# Patient Record
Sex: MALE | Race: WHITE
[De-identification: names, ages, dates, MRNs, and addresses within clinical notes are randomized per-mention and may not be internally consistent; named-entity substitution may affect disease eponyms.]

---

## 2023-09-15 ENCOUNTER — HOSPITAL ENCOUNTER (EMERGENCY)
Dept: HOSPITAL 33 - ED | Age: 78
Discharge: HOME | End: 2023-09-15
Payer: MEDICARE

## 2023-09-15 VITALS — OXYGEN SATURATION: 95 % | TEMPERATURE: 98 F

## 2023-09-15 VITALS — RESPIRATION RATE: 18 BRPM | HEART RATE: 76 BPM | DIASTOLIC BLOOD PRESSURE: 101 MMHG | SYSTOLIC BLOOD PRESSURE: 155 MMHG

## 2023-09-15 DIAGNOSIS — Z79.84: ICD-10-CM

## 2023-09-15 DIAGNOSIS — Z79.899: ICD-10-CM

## 2023-09-15 DIAGNOSIS — E11.9: ICD-10-CM

## 2023-09-15 DIAGNOSIS — E78.5: ICD-10-CM

## 2023-09-15 DIAGNOSIS — R04.0: Primary | ICD-10-CM

## 2023-09-15 DIAGNOSIS — I10: ICD-10-CM

## 2023-09-15 DIAGNOSIS — Z72.0: ICD-10-CM

## 2023-09-15 PROCEDURE — 99281 EMR DPT VST MAYX REQ PHY/QHP: CPT

## 2023-09-15 NOTE — ERPHSYRPT
- History of Present Illness


Time Seen by Provider: 09/15/23 09:11


Source: patient


Exam Limitations: no limitations


Patient Subjective Stated Complaint: Pt hadn't felt well for a few days and 

thought he was having some sinus pressure and when he woke this morning he began

having a massive nose bleed


Triage Nursing Assessment: Pt was brought to the ER by his friend, hypertensive,

denies pain, pt had a headache above his eyes last night and took a full 

strength aspirin and woke with a bloody nose at 0400, pt did take an aspirin the

night before, as he states that it helps him sleep, pt states that it was a 

large amount of blood but the bleeding appears to be under control at this time,

pt states that he has never had a nose bleed before, pulses normal, skin n/w/d, 

doesn't appear to be in any distress


Physician History: 


Patient is a 77-year-old male presents to our ED for evaluation and treatment of

epistaxis.  Patient states he awoke this morning at approximately 4 AM with a 

nosebleed.  Patient packed his nose just prior to arrival.  No active bleeding 

at this time.  Patient states yesterday he was experiencing pain in his frontal 

sinus.  Patient took a "full-strength aspirin".  Patient currently feels well.  

He denies any frontal headache or pain at this time.  Patient denies a history 

of nosebleed.  He is not on a blood thinner.  Patient otherwise feels well.  He 

voices no other complaints or concerns at this time.





Portions of this note were created with voice recognition technology.  There may

be grammatical, spelling, punctuation or sound alike errors





Timing/Duration: today


Severity: moderate


Modifying Factors: Improves With: other (Nose packing stopped the bleed.  He 

denies experiencing bleeding posteriorly into throat)


Associated Symptoms: denies symptoms


Allergies/Adverse Reactions: 








No Known Drug Allergies Allergy (Verified 09/15/23 08:37)


   





Home Medications: 








Dapagliflozin Propanediol [Farxiga] 10 mg PO DAILY 09/15/23 [History]


Losartan/Hydrochlorothiazide [Losartan-Hctz 100-25 mg Tab] 0.5 tab PO DAILY 

09/15/23 [History]


Rosuvastatin Calcium 20 mg PO DAILY 09/15/23 [History]


carvediloL [Carvedilol] 25 mg PO BID 09/15/23 [History]





Hx Influenza Vaccination/Date Given: No


Hx Pneumococcal Vaccination/Date Given: No





Travel Risk





- International Travel


Have you traveled outside of the country in past 3 weeks: No





- Coronavirus Screening


Are you exhibiting any of the following symptoms?: No


Close contact with a COVID-19 positive Pt in past 14-21 Days: No





- Vaccine Status


Have you recieved a Covid-19 vaccination: Yes


: Pfizer





- Vaccination Dates


Date of 2cond Vaccination (if applicable): 2021





- Review of Systems


Constitutional: No Symptoms, No Fever, No Chills


Eyes: No Symptoms


Ears, Nose, & Throat: No Symptoms


Respiratory: No Symptoms, No Cough, No Dyspnea


Cardiac: No Symptoms, No Chest Pain, No Edema, No Syncope


Abdominal/Gastrointestinal: No Symptoms, No Abdominal Pain, No Nausea, No 

Vomiting, No Diarrhea


Genitourinary Symptoms: No Symptoms, No Dysuria


Musculoskeletal: No Symptoms, No Back Pain, No Neck Pain


Skin: No Symptoms, No Rash


Neurological: No Symptoms, No Dizziness, No Focal Weakness, No Sensory Changes


Psychological: No Symptoms


Endocrine: No Symptoms


Hematologic/Lymphatic: No Symptoms


Immunological/Allergic: No Symptoms


All Other Systems: Reviewed and Negative





- Past Medical History


Pertinent Past Medical History: Yes


Cardiac History: High Cholesterol, Hypertension


Endocrine Medical History: Diabetes Type II


 History: Renal Disease





- Past Surgical History


Past Surgical History: No





- Social History


Smoking Status: Current some day smoker


Exposure to second hand smoke: Yes


Drug Use: none


Patient Lives Alone: Yes





- Nursing Vital Signs


Nursing Vital Signs: 


                               Initial Vital Signs











Temperature  98.0 F   09/15/23 08:23


 


Pulse Rate  90   09/15/23 08:23


 


Blood Pressure  167/78   09/15/23 08:23


 


O2 Sat by Pulse Oximetry  95   09/15/23 08:23








                                   Pain Scale











Pain Intensity                 0

















- Physical Exam


General Appearance: no apparent distress, alert


Eye Exam: PERRL/EOMI, eyes nml inspection


Ears, Nose, Throat Exam: normal ENT inspection, TMs normal, pharynx normal, 

moist mucous membranes, other (Packing in left nare which patient placed at home

 from a first-aid kit)


Neck Exam: normal inspection, non-tender, supple, full range of motion


Respiratory Exam: normal breath sounds, lungs clear, airway intact, No 

respiratory distress


Cardiovascular Exam: regular rate/rhythm, normal heart sounds, normal peripheral

 pulses


Gastrointestinal/Abdomen Exam: soft, normal bowel sounds, No tenderness, No mass


Back Exam: normal inspection, normal range of motion, No CVA tenderness, No 

vertebral tenderness


Extremity Exam: normal inspection, normal range of motion, pelvis stable


Neurologic Exam: alert, oriented x 3, cooperative, normal mood/affect, nml 

cerebellar function, nml station & gait, sensation nml, No motor deficits


Skin Exam: normal color, warm, dry, No rash


Lymphatic Exam: No adenopathy


**SpO2 Interpretation**: normal


SpO2: 95


O2 Delivery: Room Air





- Course


Nursing assessment & vital signs reviewed: Yes





- Progress


Progress: improved


Progress Note: 


77-year-old male presents to our ED with epistaxis.  Patient self packed his 

left nare prior to arrival.  We remove the packing.  No bleeding.  Patient 

asymptomatic.  Vital stable.  No indication for further work-up at this time.  

Will discharge home.  Patient advised to avoid aspirin at least until told 

otherwise by his primary care doctor.  Patient voices no other complaints or 

concerns at this time.








Portions of this note were created with voice recognition technology.  There may

 be grammatical, spelling, punctuation or sound alike errors








Complexity of problems addressed is low acute uncomplicated








No critical care time











Complex of data reviewed and analyzed is none.  No specialized testing ordered. 

 Diagnosis made based on history and physical exam.











Risk of complication and or risk morbidity/mortality patient management is low.


Vital stable.  Time spent to discharge patient is approximately 10 minutes.  

Plan of care established for shared decision making.  No social determinants of 

health present to impede follow-up.








Portions of this note were created with voice recognition technology.  There may

 be grammatical, spelling, punctuation or sound alike errors








09/15/23 09:34





Counseled pt/family regarding: diagnosis, need for follow-up





- Departure


Departure Disposition: Home


Clinical Impression: 


 Epistaxis





Condition: Stable


Critical Care Time: No


Additional Instructions: 


Discharge/Care Plan





SHEREE PEREYRA was seen on 09/15/23 in the Emergency Room. The patient was 

counseled regarding Diagnosis,Lab results, Imaging studies, need for follow up 

and when to return to the Emergency Room.





Prescriptions given:





Discharge Note





I have spoken with the patient and/or caregivers. I have explained the patient's

 condition, diagnosis and treatment plan based on the information available to 

me at this time. I have answered the patient's and/or caregiver's questions and 

addressed any concerns. The patient and/or caregivers have as good understanding

 of the patient's diagnosis, condition and treatment plan as can be expected at 

this point. The vital signs have been stable. The patient's condition is stable 

and appropriate for discharge from the emergency department.





The patient will pursue further outpatient evaluation with the primary care 

physician or other designated or consulting physician as outlined in the 

discharge instructions. The patient and/or caregivers are agreeable to this plan

 of care and follow-up instructions have been explained in detail. The patient 

and/or caregivers have received these instruction. The patient/and or caregivers

 are aware that any significant change in condition or worsening of symptoms 

should prompt an immediate return to this or the closest emergency department or

 call 911.